# Patient Record
Sex: MALE | Race: WHITE | NOT HISPANIC OR LATINO | Employment: OTHER | URBAN - METROPOLITAN AREA
[De-identification: names, ages, dates, MRNs, and addresses within clinical notes are randomized per-mention and may not be internally consistent; named-entity substitution may affect disease eponyms.]

---

## 2018-08-10 PROCEDURE — 83550 IRON BINDING TEST: CPT | Performed by: INTERNAL MEDICINE

## 2018-08-10 PROCEDURE — 85027 COMPLETE CBC AUTOMATED: CPT | Performed by: INTERNAL MEDICINE

## 2018-08-10 PROCEDURE — 83540 ASSAY OF IRON: CPT | Performed by: INTERNAL MEDICINE

## 2018-08-11 ENCOUNTER — LAB REQUISITION (OUTPATIENT)
Dept: LAB | Facility: HOSPITAL | Age: 78
End: 2018-08-11
Payer: MEDICARE

## 2018-08-11 DIAGNOSIS — E61.1 IRON DEFICIENCY: ICD-10-CM

## 2018-08-11 DIAGNOSIS — D50.9 IRON DEFICIENCY ANEMIA: ICD-10-CM

## 2018-08-11 LAB
ERYTHROCYTE [DISTWIDTH] IN BLOOD BY AUTOMATED COUNT: 17.1 % (ref 11.6–15.1)
HCT VFR BLD AUTO: 28 % (ref 36.5–49.3)
HGB BLD-MCNC: 7.9 G/DL (ref 12–17)
IRON SATN MFR SERPL: 11 %
IRON SERPL-MCNC: 23 UG/DL (ref 65–175)
IRON SERPL-MCNC: 23 UG/DL (ref 65–175)
MCH RBC QN AUTO: 24.2 PG (ref 26.8–34.3)
MCHC RBC AUTO-ENTMCNC: 28.2 G/DL (ref 31.4–37.4)
MCV RBC AUTO: 86 FL (ref 82–98)
PLATELET # BLD AUTO: 400 THOUSANDS/UL (ref 149–390)
PMV BLD AUTO: 9.7 FL (ref 8.9–12.7)
RBC # BLD AUTO: 3.26 MILLION/UL (ref 3.88–5.62)
TIBC SERPL-MCNC: 212 UG/DL (ref 250–450)
WBC # BLD AUTO: 10.13 THOUSAND/UL (ref 4.31–10.16)

## 2020-07-22 ENCOUNTER — TRANSCRIBE ORDERS (OUTPATIENT)
Dept: NEUROLOGY | Facility: CLINIC | Age: 80
End: 2020-07-22

## 2020-07-22 DIAGNOSIS — R41.3 MEMORY LOSS: Primary | ICD-10-CM

## 2020-07-27 ENCOUNTER — TELEPHONE (OUTPATIENT)
Dept: NEUROLOGY | Facility: CLINIC | Age: 80
End: 2020-07-27

## 2020-07-27 ENCOUNTER — CONSULT (OUTPATIENT)
Dept: NEUROLOGY | Facility: CLINIC | Age: 80
End: 2020-07-27
Payer: MEDICARE

## 2020-07-27 VITALS
HEART RATE: 72 BPM | DIASTOLIC BLOOD PRESSURE: 82 MMHG | TEMPERATURE: 98.8 F | HEIGHT: 73 IN | SYSTOLIC BLOOD PRESSURE: 146 MMHG | OXYGEN SATURATION: 90 % | RESPIRATION RATE: 18 BRPM

## 2020-07-27 DIAGNOSIS — Z13.6 ENCOUNTER FOR SCREENING FOR STENOSIS OF CAROTID ARTERY: ICD-10-CM

## 2020-07-27 DIAGNOSIS — R41.3 MEMORY DIFFICULTY: Primary | ICD-10-CM

## 2020-07-27 PROCEDURE — 99204 OFFICE O/P NEW MOD 45 MIN: CPT | Performed by: PSYCHIATRY & NEUROLOGY

## 2020-07-27 RX ORDER — AMLODIPINE BESYLATE 10 MG/1
TABLET ORAL
COMMUNITY

## 2020-07-27 RX ORDER — FUROSEMIDE 40 MG/1
40 TABLET ORAL 3 TIMES DAILY
COMMUNITY

## 2020-07-27 RX ORDER — RIVASTIGMINE 4.6 MG/24H
1 PATCH, EXTENDED RELEASE TRANSDERMAL DAILY
Qty: 30 PATCH | Refills: 0 | Status: SHIPPED | OUTPATIENT
Start: 2020-07-27

## 2020-07-27 RX ORDER — METOPROLOL TARTRATE 50 MG/1
TABLET, FILM COATED ORAL
COMMUNITY

## 2020-07-27 RX ORDER — POTASSIUM CHLORIDE 20 MEQ/1
TABLET, EXTENDED RELEASE ORAL
COMMUNITY

## 2020-07-27 RX ORDER — ATORVASTATIN CALCIUM 20 MG/1
TABLET, FILM COATED ORAL
COMMUNITY

## 2020-07-27 RX ORDER — CARVEDILOL 25 MG/1
TABLET ORAL
COMMUNITY
Start: 2020-07-13

## 2020-07-27 RX ORDER — RIVASTIGMINE 9.5 MG/24H
1 PATCH, EXTENDED RELEASE TRANSDERMAL DAILY
Qty: 30 PATCH | Refills: 5 | Status: SHIPPED | OUTPATIENT
Start: 2020-07-27

## 2020-07-27 RX ORDER — ALBUTEROL SULFATE 90 UG/1
AEROSOL, METERED RESPIRATORY (INHALATION)
COMMUNITY

## 2020-07-27 RX ORDER — ASPIRIN 81 MG
100 TABLET, DELAYED RELEASE (ENTERIC COATED) ORAL 2 TIMES DAILY
COMMUNITY

## 2020-07-27 NOTE — PROGRESS NOTES
Macey Pike is a 68 y o  male  Chief Complaint   Patient presents with    Memory Loss     Patient has short term memory loss  Assessment:  1  Memory difficulty    2  Encounter for screening for stenosis of carotid artery          Discussion:  Patient has mild cognitive impairment, would recommend an MRI scan of the brain with neuro quant especially given his history of lung cancer, also would recommend routine blood work and a carotid ultrasound to evaluate for carotid artery stenosis, patient's family to call me after the test to discuss the results  We discussed different medication options for his memory difficulty, side effects of Exelon patch were discussed with the patient  He will discuss with his family physician prior to going on the Exelon patch, he was advised mentally stimulating exercises, to take fall and safety precautions, they will also discuss with family physician regarding his mood and try to go on an antidepressant, to keep his blood pressure cholesterol and sugar under comment, to be under constant supervision, to go to the hospital if has any worsening symptoms and call me otherwise to see me back in 2 months and follow up with his other physicians      Subjective:    HPI   Patient is here accompanied with his daughter for evaluation of memory difficulty for the last 1 year, according to the daughter patient has mostly been homebound secondary to the current COVID situation and also was diagnosed with lung cancer recently and is on radiation treatment and is awaiting a PET scan, he has been having mostly short-term memory issues, denies having any headache, do long-term memory is good, appetite is decent, weight has been stable, no bowel and bladder incontinence, he ambulates with the cane or a walker, currently he is on wheelchair secondary to being tired, he has history of CVA in the past and is currently on Xarelto, no seizures, at times he feels he is depressed, but no suicidal or homicidal thoughts, no agitation, sleep is good, he does not drive, lives with his daughter and is under constant supervision, he does take care of his own finances without any issues, no other complaints  Vitals:    07/27/20 1427   BP: 146/82   BP Location: Left arm   Patient Position: Sitting   Cuff Size: Standard   Pulse: 72   Resp: 18   Temp: 98 8 °F (37 1 °C)   Height: 6' 1" (1 854 m)       Current Medications    Current Outpatient Medications:     albuterol (ProAir HFA) 90 mcg/act inhaler, ProAir HFA 90 mcg/actuation aerosol inhaler, Disp: , Rfl:     amLODIPine (NORVASC) 10 mg tablet, amlodipine 10 mg tablet, Disp: , Rfl:     atorvastatin (LIPITOR) 20 mg tablet, atorvastatin 20 mg tablet, Disp: , Rfl:     carvedilol (COREG) 25 mg tablet, , Disp: , Rfl:     Docusate Sodium (Stool Softener) 100 MG capsule, Take 100 mg by mouth 2 (two) times a day, Disp: , Rfl:     fluticasone-salmeterol (Advair Diskus) 250-50 mcg/dose inhaler, Advair Diskus 250 mcg-50 mcg/dose powder for inhalation, Disp: , Rfl:     furosemide (LASIX) 40 mg tablet, Take 40 mg by mouth Three times a day, Disp: , Rfl:     metFORMIN (GLUCOPHAGE) 500 mg tablet, , Disp: , Rfl:     metoprolol tartrate (LOPRESSOR) 50 mg tablet, metoprolol tartrate 50 mg tablet, Disp: , Rfl:     potassium chloride (K-DUR,KLOR-CON) 20 mEq tablet, potassium chloride ER 20 mEq tablet,extended release(part/cryst), Disp: , Rfl:     rivaroxaban (XARELTO) 20 mg tablet, Take 20 mg by mouth, Disp: , Rfl:     tiotropium (SPIRIVA RESPIMAT) 2 5 MCG/ACT AERS inhaler, Inhale daily, Disp: , Rfl:       Allergies  Patient has no known allergies  Past Medical History  Past Medical History:   Diagnosis Date    [facility]     [facility] cancer [facility]     [facility]     Vision loss          Past Surgical History:  History reviewed  No pertinent surgical history  Family History:  History reviewed  No pertinent family history      Social History: reports that he has never smoked  He has never used smokeless tobacco  He reports that he drank alcohol  He reports that he has current or past drug history  I have reviewed the past medical history, surgical history, social and family history, current medications, allergies vitals, review of systems, and updated this information as appropriate today  Objective:    Physical Exam    Neurological Exam    GENERAL:  Cooperative in no acute distress  Well-developed and well-nourished    HEAD and NECK   Head is atraumatic normocephalic with no lesions or masses  Neck is supple with full range of motion    CARDIOVASCULAR  Carotid Arteries-no carotid bruits  NEUROLOGIC:  Mental Status-the patient is awake alert and oriented without aphasia or apraxia, [place] is 21/30  Cranial Nerves: Visual fields are full to confrontation  Extraocular movements are full without nystagmus  Pupils are 2-1/2 mm and reactive  Face is symmetrical to light touch  Movements of facial expression move symmetrically  Hearing is normal to finger rub bilaterally  Soft palate lifts symmetrically  Shoulder shrug is symmetrical  Tongue is midline without atrophy  Patient did remove the mask for the exam  Motor: No drift is noted on arm extension  Strength is full in the upper and lower extremities with normal bulk and tone  Sensory:  Decreased light touch pinprick temperature sensation in a stocking distribution  He has an Ace wrap in his lower extremity for his circulation  Cortical function is intact  Coordination: Finger to nose testing is performed accurately  Patient on a wheelchair  Reflexes:  1+ and symmetrical, planters are withdrawal          ROS:  Review of Systems   Constitutional: Negative  Negative for appetite change and fever  HENT: Negative  Negative for hearing loss, tinnitus, trouble swallowing and voice change  Eyes: Negative  Negative for photophobia and pain  Respiratory: Negative    Negative for shortness of breath  Cardiovascular: Negative  Negative for palpitations  Gastrointestinal: Negative  Negative for nausea and vomiting  Endocrine: Negative  Negative for cold intolerance  Genitourinary: Negative  Negative for dysuria, frequency and urgency  Musculoskeletal: Negative  Negative for myalgias and neck pain  Skin: Negative  Negative for rash  Allergic/Immunologic: Negative  Neurological: Positive for speech difficulty  Negative for dizziness, tremors, seizures, syncope, facial asymmetry, weakness, light-headedness, numbness and headaches  Hematological: Negative  Does not bruise/bleed easily  Psychiatric/Behavioral: Positive for confusion  Negative for hallucinations and sleep disturbance  Memory problems    All other systems reviewed and are negative

## 2020-07-27 NOTE — TELEPHONE ENCOUNTER
Called and spoke with Uma Teixeira asking if patient has ever seen a Neurologist, she asked frank and he stated no he has never seen a neurologist but she states he might have seen a neurologist because he had a stroke and she would like me to contact Dr Salvador Macedo office to verify with him  She also states she isn't sure they will be coming in the office, she will see if her  can convince him to go to the appointment  I let her know if anything to give our office a call back, she understood and thanked us for calling

## 2020-10-23 ENCOUNTER — TELEPHONE (OUTPATIENT)
Dept: NEUROLOGY | Facility: CLINIC | Age: 80
End: 2020-10-23